# Patient Record
(demographics unavailable — no encounter records)

---

## 2025-05-14 NOTE — CONSULT LETTER
[Dear  ___] : Dear  [unfilled], [Courtesy Letter:] : I had the pleasure of seeing your patient, [unfilled], in my office today. [Please see my note below.] : Please see my note below. [Consult Closing:] : Thank you very much for allowing me to participate in the care of this patient.  If you have any questions, please do not hesitate to contact me. [Sincerely,] : Sincerely, [FreeTextEntry3] : Marion Alfonso MD [facility] at [facility] Otolaryngology  Head & Neck Surgery [facility]   Department of Otolaryngology [facility] [employer] [facility] at [facility]  Office Tel: [phone]

## 2025-05-14 NOTE — ASSESSMENT
[FreeTextEntry1] : She has a history of intermittent eustachian tube dysfunction since 2022.  She has had right ear pain.  On exam, she had cerumen which was removed.  Audiogram was normal.  Flexible laryngoscopy showed laryngeal changes consistent reflux but was otherwise normal.  I discussed possible etiologies of ear pressure and pain.  Etiologies include eustachian tube dysfunction, allergies, reflux, TMJ dysfunction, other musculoskeletal sources, atypical migraines, possible inner ear disease, and less likely, lesions  Plan -Findings and management options were discussed with the patient. - Good ear hygiene reviewed.  Avoid using cotton swabs in the ears - Wax removal drops as needed - Noise precautions recommended - Monitor hearing - Allergy and sinus medications as needed - Continue treatment for reflux - Consider dental evaluation to TMJ dysfunction - Consider further workup with CT scan if her symptoms persist - I have asked her to follow-up in 2 to 3 weeks.  She should call me and return earlier if she has any problems or worsening symptoms

## 2025-05-14 NOTE — HISTORY OF PRESENT ILLNESS
[de-identified] : INDERJIT DEVINE is a 47 year old patient Here for right ear pain and pressure.  It started after she flew in 2022.  She was told that she had a near rupture of her eardrum.  She saw Dr. Khan and her evaluation was normal.  She has had intermittent symptoms.  It typically will happen if she has an upper respiratory tract infection.  She was told she had cerumen impaction.  She has no nasal or throat symptoms.  She has no otorrhea or tinnitus.  She has a history of allergies She has a history of reflux.  She takes Pepcid and sees a GI She may have TMJ dysfunction but has not been diagnosed No history of recurrent ear infections, prior otologic surgery or excessive noise exposure She does not smoke tobacco or marijuana

## 2025-05-14 NOTE — PHYSICAL EXAM
[TextEntry] : PHYSICAL EXAM  General: The patient was alert, oriented and in no distress. Voice was clear.  Face: The patient had no facial asymmetry or mass. The skin was unremarkable.  Ears: Hearing normal to conversational voice External ears were normal without deformity. External ear canals: AS- normal. no cerumen or inflammation. AD- cerumen impaction Tympanic membranes: AS- intact. no perforation or effusion  PROCEDURE- EAR MICROSCOPY AND CERUMEN REMOVAL from right ear  Indication: cerumen removal Under the microscope, obstructing cerumen medially was removed atraumatically from the right ear with a suction, curette and/or forceps.  It was overlying the tympanic membrane.  Canal: normal. no inflammation.  Tympanic membrane: Intact. no perforation or effusion.  Nose:  The external nose had no significant deformity.. On anterior rhinoscopy, the nasal mucosa was clear.  The anterior septum was grossly midline. There were no visualized polyps, purulence  or masses.   Oral cavity: Oral mucosa- normal. Oral and base of tongue- clear and without mass. Gingival and buccal mucosa- moist and without lesions. Palate- the palate moved well. There was no cleft palate. There appeared to be good salivary flow.   Oral cavity/oropharynx- no pus, erythema or mass  Neck:  The neck was symmetrical. The parotid and submandibular glands were normal without masses. The trachea was midline and there was no unusual crepitus. Thyroid was smooth and nontender and no masses were palpated. No masses  Lymphatics: Cervical adenopathy- none.  PROCEDURE:  FLEXIBLE LARYNGOSCOPY   Surgeon: Dr. Alfonso Indication: Ear pain, history of reflux, assess for nasopharyngeal lesions, inadequate/inability to tolerate transoral exam Anesthetic: Topical lidocaine and Afrin  Procedure: The patient was placed in a sitting position.  Following application of the topical anesthetic and decongestant, exam was performed with a flexible telescope.  The scope was passed along the right nasal floor to the nasopharynx.  It was then passed into the region of the middle meatus, middle turbinate, and sphenoethmoid region. The following findings were noted:  Nasal mucosa: Mild edema Septum: Deviated to the right  nasal cavity      Inferior turbinate: Normal      Middle turbinate: normal      Superior turbinate: normal      Inferior meatus: no pus, polyps or congestion      Middle meatus:  no pus, polyps or congestion      Superior meatus:  no pus, polyps, or congestion      Sphenoethmoidal recess: no pus, polyps or congestion   Nasopharynx: no masses, choanae patent, no adenoid tissue  Base of tongue and vallecula: no masses or asymmetry Posterior pharyngeal wall: no masses. Hypopharynx: symmetrical. No masses Pyriform sinuses: no lesions or pooling of secretions Epiglottis: normal. No edema or lesions Aryepiglottic folds: normal. No lesions. True vocal cords: clear and mobile. No lesions. Airway patent False vocal cords: normal Ventricles: no masses. Arytenoids: Normal Interarytenoid area: no masses.  Moderate edema Subglottis: normal. no masses     AUDIOGRAM- test ordered and the results reviewed and discussed with the patient.  It was compared with her prior audiogram Hearing-normal Word recognition-normal Tympanograms- AD- type A, AS- type A